# Patient Record
Sex: MALE | Race: WHITE | Employment: OTHER | URBAN - METROPOLITAN AREA
[De-identification: names, ages, dates, MRNs, and addresses within clinical notes are randomized per-mention and may not be internally consistent; named-entity substitution may affect disease eponyms.]

---

## 2017-06-01 PROBLEM — Z01.810 PREOP CARDIOVASCULAR EXAM: Status: ACTIVE | Noted: 2017-06-01

## 2017-06-01 PROBLEM — I63.89 CEREBROVASCULAR ACCIDENT (CVA) DUE TO OTHER MECHANISM (HCC): Status: ACTIVE | Noted: 2017-06-01

## 2017-06-01 PROBLEM — Z95.1 S/P CABG X 4: Status: ACTIVE | Noted: 2017-06-01

## 2017-06-01 PROBLEM — I25.10 CORONARY ARTERY DISEASE INVOLVING NATIVE CORONARY ARTERY OF NATIVE HEART WITHOUT ANGINA PECTORIS: Status: ACTIVE | Noted: 2017-06-01

## 2018-09-12 ENCOUNTER — TELEPHONE (OUTPATIENT)
Dept: SURGERY | Facility: CLINIC | Age: 63
End: 2018-09-12

## 2018-09-12 ENCOUNTER — OFFICE VISIT (OUTPATIENT)
Dept: SURGERY | Facility: CLINIC | Age: 63
End: 2018-09-12
Payer: COMMERCIAL

## 2018-09-12 VITALS
HEIGHT: 71 IN | HEART RATE: 60 BPM | DIASTOLIC BLOOD PRESSURE: 78 MMHG | BODY MASS INDEX: 27.3 KG/M2 | SYSTOLIC BLOOD PRESSURE: 140 MMHG | WEIGHT: 195 LBS

## 2018-09-12 DIAGNOSIS — Z98.1 S/P LUMBAR FUSION: Primary | ICD-10-CM

## 2018-09-12 DIAGNOSIS — M54.9 INTRACTABLE BACK PAIN: ICD-10-CM

## 2018-09-12 DIAGNOSIS — M96.1 FAILED BACK SYNDROME: ICD-10-CM

## 2018-09-12 DIAGNOSIS — M54.50 MIDLINE LOW BACK PAIN WITHOUT SCIATICA, UNSPECIFIED CHRONICITY: ICD-10-CM

## 2018-09-12 PROCEDURE — 99204 OFFICE O/P NEW MOD 45 MIN: CPT | Performed by: NEUROLOGICAL SURGERY

## 2018-09-12 NOTE — H&P
Neurosurgery Clinic Visit  2018    Mick Kang PCP:  Wale Ortiz DO    10/8/1955 MRN [id number]       CC:  Back Pain    HPI:    Shelbi Hurtado is a very pleasant 60year old male who presents with low back pain since L2-S1 instrumented fusion with  causing severe laceration across his right wrist, he had extensive surgery for the right hand and has regained some function but limited particularly 4th/5th digit.     Imaging:    CT Lumbar -     Left S1 screw fx, Right S1 screw hallowing, right L3 screw i Apparent Distress, Well Nourished, Well Developed, Normal Voice, Mood Appropriate, Appears Stated Age  HEENT: No Scleral Icterus, Good Dentition, No Facial Asymmetry  Lungs: CTA B/L to bases, no w/r/r, Normal Inspiratory Effort  Cardiac: RRR S1 S2 no murmu options, and expectations were discussed. All questions were answered to satisfaction. Total visit time = 45 Minutes; More than 50% spent coordinating care and counseling.      Sahra Yoon PA-C  [facility]  9/12/2018  2:42 PM

## 2018-09-12 NOTE — PATIENT INSTRUCTIONS
Refill policies:    • Allow 2-3 business days for refills; controlled substances may take longer.   • Contact your pharmacy at least 5 days prior to running out of medication and have them send an electronic request or submit request through the “request re entire amount billed. Precertification and Prior Authorizations: If your physician has recommended that you have a procedure or additional testing performed.   [facility]) will contact your insurance carrier to obtain pre-certi

## 2018-09-12 NOTE — PROGRESS NOTES
Location of Pain:  Pt states that he has lower back pain. Pt states that he has numbness and tingling in the right leg. Pt states that he has numbness and tingling in the bilateral feet. Pt states that he had a lumbar fusion in Nov 2018.   Pt states that h

## 2018-09-13 ENCOUNTER — TELEPHONE (OUTPATIENT)
Dept: SURGERY | Facility: CLINIC | Age: 63
End: 2018-09-13

## 2018-09-13 NOTE — TELEPHONE ENCOUNTER
Received records: Discharge Summary & OP Report both dated 12/05/17. Endorsed to provider for review.

## 2018-09-17 NOTE — TELEPHONE ENCOUNTER
rcvd imaging reports/CDs from [facility] which include XR spine Lumbar(2/23/18),Xr spine lumbar (1/11/18), US venous Duplex lwr ext (12/2/17),CT Spine Lumbar (11/30/17), Xr Spine Lumbar (11/29/17), Xr Hip bilat(5/12/17), MRI Spine lumbar(1/25/17).  Mailed

## 2018-09-18 NOTE — TELEPHONE ENCOUNTER
LMTCB.     If patient wants to complete imaging elsewhere he will need to let our office know and we can fax the order.

## 2018-09-18 NOTE — TELEPHONE ENCOUNTER
I can see his uploaded imaging in PACS. I do not see a post op MRI w/ and w/o contrast.  I discussed with the patient that we would need this if not yet completed. I will place the order.

## 2018-10-11 ENCOUNTER — TELEPHONE (OUTPATIENT)
Dept: SURGERY | Facility: CLINIC | Age: 63
End: 2018-10-11

## 2018-10-11 NOTE — TELEPHONE ENCOUNTER
He does have an abdominal aortic mesh. He has had MRIs of the back since having this mesh placed. If they need official clearance from the  they would need to contact the office that placed the mesh.

## 2018-10-11 NOTE — TELEPHONE ENCOUNTER
ANEUDY. Patient will need to get his cardiologist to fax clearance to MRI department re: abdominal mesh.

## 2018-10-11 NOTE — TELEPHONE ENCOUNTER
Spoke with [facility] tech who stated they had to cancel patient's MRI. Patient informed MRI that he had back surgery and cardiac stents, but the metal wand detected medal in his abdomen.      Per Tech she believes patient also has an abdominal aortic m

## 2018-10-12 ENCOUNTER — TELEPHONE (OUTPATIENT)
Dept: SURGERY | Facility: CLINIC | Age: 63
End: 2018-10-12

## 2018-10-15 ENCOUNTER — OFFICE VISIT (OUTPATIENT)
Dept: SURGERY | Facility: CLINIC | Age: 63
End: 2018-10-15
Payer: COMMERCIAL

## 2018-10-15 VITALS — DIASTOLIC BLOOD PRESSURE: 80 MMHG | HEART RATE: 60 BPM | SYSTOLIC BLOOD PRESSURE: 145 MMHG

## 2018-10-15 DIAGNOSIS — M96.1 FAILED BACK SYNDROME: ICD-10-CM

## 2018-10-15 DIAGNOSIS — Z98.1 S/P LUMBAR FUSION: Primary | ICD-10-CM

## 2018-10-15 PROCEDURE — 99213 OFFICE O/P EST LOW 20 MIN: CPT | Performed by: PHYSICIAN ASSISTANT

## 2018-10-15 NOTE — PROGRESS NOTES
Neurosurgery Clinic Visit  10/15/2018    Yefri Adame PCP:  Jacki Escalante DO    10/8/1955 MRN [id number]       CC:  Back Pain    HPI:    Gela Nirali is here for f/u. He did not get xrays completed.   He tried to have MRI completed but was told by the tech over the last 10 months. He wore a brace for 3 months, he did not use a bone stimulator. He does not think his symptoms have worsened or changed in the last 3 months.   When he walks 100 feet or stands for a period of time he continues to bend forward unt tender with hypersensitivities, B/L SI Joint Non-tender, B/L Trochanter Non-tender  Wound well-healed, no erythema, edema, or discharge  Upper extremity strength:     Deltoid Biceps Triceps Wrist Extension  Finger Abduction Finger Extension   Right 5 5

## 2018-10-15 NOTE — TELEPHONE ENCOUNTER
Patient was seen today in the office and was reminded to get clearance letter for the MRI department in order to proceed with MRI.

## 2019-05-07 ENCOUNTER — HOSPITAL ENCOUNTER (OUTPATIENT)
Dept: MRI IMAGING | Age: 64
Discharge: HOME | End: 2019-05-07
Attending: PHYSICIAN ASSISTANT
Payer: COMMERCIAL

## 2019-05-07 PROCEDURE — A9575 INJ GADOTERATE MEGLUMI 0.1ML: HCPCS | Performed by: PHYSICIAN ASSISTANT

## 2019-05-07 PROCEDURE — 72158 MRI LUMBAR SPINE W/O & W/DYE: CPT | Performed by: PHYSICIAN ASSISTANT

## 2019-07-17 ENCOUNTER — OFFICE VISIT (OUTPATIENT)
Dept: SURGERY | Facility: CLINIC | Age: 64
End: 2019-07-17
Payer: COMMERCIAL

## 2019-07-17 VITALS — HEART RATE: 60 BPM | SYSTOLIC BLOOD PRESSURE: 124 MMHG | DIASTOLIC BLOOD PRESSURE: 70 MMHG

## 2019-07-17 DIAGNOSIS — M41.86 OTHER FORM OF SCOLIOSIS OF LUMBAR SPINE: Primary | ICD-10-CM

## 2019-07-17 PROCEDURE — 99213 OFFICE O/P EST LOW 20 MIN: CPT | Performed by: NEUROLOGICAL SURGERY

## 2019-07-17 NOTE — PROGRESS NOTES
Pt is here for follow up. Pt was last seen 10/15/18. Pt completed MRI of the lumbar. 5/7/19. Pt states that his pain is worse since LOV. Pt states that he has more pain in the lumber/ bilateral legs.

## 2019-07-17 NOTE — PROGRESS NOTES
[facility]  Neurosurgery Clinic Visit      HISTORY OF PRESENT ILLNESS:  Katrin Andino is a(n) 58year old male who returns for follow-up after MRI, last seen in 09/18.   He has a history of L2-S1 laminectomies and fusion with Dr. Rosaline Bolivar 4   Sensation: intact to light touch bilaterally     Reflexes: 1+, no clonus, no chadwick's   Coordination: deferred   Gait: deferred       DIAGNOSTIC DATA:      IMAGING:  MRI lumbar reviewed.  L2-S1 with post-surgical changes including persistent seroma wit

## 2021-03-03 DIAGNOSIS — Z23 NEED FOR VACCINATION: ICD-10-CM
